# Patient Record
Sex: FEMALE | Race: WHITE | NOT HISPANIC OR LATINO | URBAN - METROPOLITAN AREA
[De-identification: names, ages, dates, MRNs, and addresses within clinical notes are randomized per-mention and may not be internally consistent; named-entity substitution may affect disease eponyms.]

---

## 2024-06-04 ENCOUNTER — OFFICE VISIT (OUTPATIENT)
Dept: URGENT CARE | Facility: CLINIC | Age: 54
End: 2024-06-04
Payer: COMMERCIAL

## 2024-06-04 VITALS
DIASTOLIC BLOOD PRESSURE: 68 MMHG | HEART RATE: 43 BPM | BODY MASS INDEX: 22.41 KG/M2 | WEIGHT: 121.8 LBS | SYSTOLIC BLOOD PRESSURE: 100 MMHG | TEMPERATURE: 97.8 F | RESPIRATION RATE: 18 BRPM | HEIGHT: 62 IN | OXYGEN SATURATION: 100 %

## 2024-06-04 DIAGNOSIS — R42 EPISODIC LIGHTHEADEDNESS: ICD-10-CM

## 2024-06-04 DIAGNOSIS — R53.83 FATIGUE, UNSPECIFIED TYPE: ICD-10-CM

## 2024-06-04 DIAGNOSIS — R51.9 NONINTRACTABLE EPISODIC HEADACHE, UNSPECIFIED HEADACHE TYPE: ICD-10-CM

## 2024-06-04 DIAGNOSIS — M79.10 MYALGIA: Primary | ICD-10-CM

## 2024-06-04 DIAGNOSIS — R68.83 CHILLS: ICD-10-CM

## 2024-06-04 DIAGNOSIS — R00.1 BRADYCARDIA: ICD-10-CM

## 2024-06-04 LAB
SL AMB  POCT GLUCOSE, UA: ABNORMAL
SL AMB LEUKOCYTE ESTERASE,UA: ABNORMAL
SL AMB POCT BILIRUBIN,UA: ABNORMAL
SL AMB POCT BLOOD,UA: ABNORMAL
SL AMB POCT CLARITY,UA: CLEAR
SL AMB POCT COLOR,UA: YELLOW
SL AMB POCT KETONES,UA: ABNORMAL
SL AMB POCT NITRITE,UA: ABNORMAL
SL AMB POCT PH,UA: 7
SL AMB POCT SPECIFIC GRAVITY,UA: 1
SL AMB POCT URINE PROTEIN: ABNORMAL
SL AMB POCT UROBILINOGEN: 0.2

## 2024-06-04 PROCEDURE — 87147 CULTURE TYPE IMMUNOLOGIC: CPT | Performed by: FAMILY MEDICINE

## 2024-06-04 PROCEDURE — 81002 URINALYSIS NONAUTO W/O SCOPE: CPT | Performed by: FAMILY MEDICINE

## 2024-06-04 PROCEDURE — 87086 URINE CULTURE/COLONY COUNT: CPT | Performed by: FAMILY MEDICINE

## 2024-06-04 PROCEDURE — 99203 OFFICE O/P NEW LOW 30 MIN: CPT | Performed by: FAMILY MEDICINE

## 2024-06-04 RX ORDER — ALPRAZOLAM 0.25 MG/1
0.25 TABLET ORAL
COMMUNITY

## 2024-06-04 NOTE — PATIENT INSTRUCTIONS
Patient requires further evaluation and testing to check for conditions that can cause similar symptoms including but not limited to, a tick borne illness, anemia, thryoid hormone issues, and other vitamin or hormone imbalances. Patient also has a low HR at 43, per review of records when patient was seen in April 2024 at [facility], her HR was 46, so this may be the patient's baseline. However I have advised for the patient to follow up with cardiology for further evaluation and treatment. Patient is to rest, drink plenty of fluids, and take Tylenol or Ibuprofen as needed. Patient has been instructed to follow up w/ her PCP office for re-check by the end of the week. If symptoms persist despite treatment, worsen, or any new symptoms present, patient is to be seen in the ER.

## 2024-06-04 NOTE — PROGRESS NOTES
[facility]        NAME: Annabelle Feliz is a 53 y.o. female  : 1970    MRN: [id number]  DATE: 2024  TIME: 5:24 PM    Assessment and Plan   Myalgia [M79.10]  1. Myalgia  POCT urine dip    Urine culture      2. Fatigue, unspecified type        3. Chills        4. Nonintractable episodic headache, unspecified headache type        5. Episodic lightheadedness        6. Bradycardia          Patient Instructions     Patient Instructions   Patient requires further evaluation and testing to check for conditions that can cause similar symptoms including but not limited to, a tick borne illness, anemia, thryoid hormone issues, and other vitamin or hormone imbalances. Patient also has a low HR at 43, per review of records when patient was seen in 2024 at [facility], her HR was 46, so this may be the patient's baseline. However I have advised for the patient to follow up with cardiology for further evaluation and treatment. Patient is to rest, drink plenty of fluids, and take Tylenol or Ibuprofen as needed. Patient has been instructed to follow up w/ her PCP office for re-check by the end of the week. If symptoms persist despite treatment, worsen, or any new symptoms present, patient is to be seen in the ER.    Follow up with PCP in 3-5 days.  Proceed to  ER if symptoms worsen.    If tests have been performed at [facility], our office will contact you with results if changes need to be made to the care plan discussed with you at the visit.  [facility]    Chief Complaint     Chief Complaint   Patient presents with    Fatigue     Patient feeling tired, some back pain, chills, maybe fever, thinks might be a uti.     History of Present Illness     52 yo female has been ill x 2 days. She is experiencing multiple constitutional symptoms including chills, intermittent headaches, body aches, lower back discomfort, fatigue, episodic lightheadedness, and patient states she  feels just generally unwell. No fever, she is currently afebrile at 97.8. She would like to be tested for a UTI because she states she felt this way once previously when she had a UTI. Her back discomfort is primarily in her lumbar region, there is no flank pain. Patient is not experiencing any UTI symptoms. No vaginal bleeding or discharge. No hematuria. No nausea, vomiting, or diarrhea. No abdominal or pelvic pain. No pain radiating into the legs. No numbness/tingling or weakness of the legs. No saddle anesthesia. No loss of bowel/bladder control. No chest pain, SOB, or palpitations. Patient's HR is 43, per review of records, patient's HR in April 2024 was 46. Patient states her pulse runs low as baseline. She states she has seen cardiology previously for a history of MVP, however has not been to their office for follow up in a few years. She is not experiencing any URI symptoms. No sore throat. No cough or wheezing. Patient is not a smoker. No neck pain or stiffness. No joint pains. No recent travel, however patient notes she is going to [place] next week. No known tick bites. She has not seen any bull's eye rashes. No known sick contacts. No dizziness or vision changes. No focal or generalized weakness. No slurred speech. No numbness/tingling. No syncopal episodes. She has not attempted any treatment for the symptoms.      Review of Systems   Review of Systems   Constitutional:  Positive for chills and fatigue.   HENT: Negative.     Eyes: Negative.    Respiratory: Negative.  Negative for shortness of breath.    Cardiovascular: Negative.  Negative for chest pain and palpitations.   Gastrointestinal: Negative.    Endocrine: Negative.    Genitourinary: Negative.    Musculoskeletal:  Positive for back pain and myalgias.   Skin: Negative.  Negative for rash.   Allergic/Immunologic:        As noted in chart   Neurological:  Positive for light-headedness and headaches. Negative for dizziness, tremors, seizures, syncope,  "facial asymmetry, speech difficulty, weakness and numbness.   Hematological: Negative.      Current Medications       Current Outpatient Medications:     ALPRAZolam (XANAX) 0.25 mg tablet, Take 0.25 mg by mouth Take 30 minues before flight, Disp: , Rfl:     Current Allergies     Allergies as of 2024 - Reviewed 2024   Allergen Reaction Noted    Cephalexin Hives 2022    Penicillins Other (See Comments) 2021            The following portions of the patient's history were reviewed and updated as appropriate: allergies, current medications, past family history, past medical history, past social history, past surgical history and problem list.     Past Medical History:   Diagnosis Date    Patient denies medical problems        Past Surgical History:   Procedure Laterality Date     SECTION      x2       Family History   Problem Relation Age of Onset    Aneurysm Mother     Multiple sclerosis Father          Medications have been verified.        Objective   /68   Pulse (!) 43   Temp 97.8 °F (36.6 °C)   Resp 18   Ht 5' 2\" (1.575 m)   Wt 55.2 kg (121 lb 12.8 oz)   SpO2 100%   BMI 22.28 kg/m²   No LMP recorded.       Physical Exam     Physical Exam  Vitals and nursing note reviewed.   Constitutional:       General: She is awake. She is not in acute distress.     Appearance: Normal appearance. She is well-developed, well-groomed and normal weight. She is not ill-appearing, toxic-appearing or diaphoretic.   HENT:      Head: Normocephalic and atraumatic.      Jaw: There is normal jaw occlusion.      Right Ear: Tympanic membrane, ear canal and external ear normal.      Left Ear: Tympanic membrane, ear canal and external ear normal.      Nose: Nose normal.      Mouth/Throat:      Lips: Pink. No lesions.      Mouth: Mucous membranes are moist.      Pharynx: Oropharynx is clear. Uvula midline.   Eyes:      General: Lids are normal. Vision grossly intact. Gaze aligned appropriately.      " Extraocular Movements: Extraocular movements intact.      Conjunctiva/sclera: Conjunctivae normal.      Pupils: Pupils are equal, round, and reactive to light.   Neck:      Trachea: Trachea and phonation normal.   Cardiovascular:      Rate and Rhythm: Regular rhythm. Bradycardia present.      Pulses: Normal pulses.      Heart sounds: Normal heart sounds.   Pulmonary:      Effort: Pulmonary effort is normal. No tachypnea, accessory muscle usage or respiratory distress.      Breath sounds: Normal breath sounds and air entry.   Abdominal:      General: Abdomen is flat. Bowel sounds are normal. There is no distension.      Palpations: Abdomen is soft. There is no mass.      Tenderness: There is no abdominal tenderness. There is no right CVA tenderness, left CVA tenderness, guarding or rebound.      Hernia: No hernia is present.   Musculoskeletal:         General: No swelling, tenderness, deformity or signs of injury. Normal range of motion.      Cervical back: Normal range of motion and neck supple. No edema, erythema, rigidity or tenderness. No pain with movement. Normal range of motion.      Right lower leg: No edema.      Left lower leg: No edema.   Lymphadenopathy:      Cervical: No cervical adenopathy.   Skin:     General: Skin is warm and dry.      Capillary Refill: Capillary refill takes less than 2 seconds.      Coloration: Skin is not pale.      Findings: No rash.      Comments: No Bull's eye rash   Neurological:      General: No focal deficit present.      Mental Status: She is alert and oriented to person, place, and time. Mental status is at baseline.      Cranial Nerves: Cranial nerves 2-12 are intact.      Sensory: Sensation is intact.      Motor: Motor function is intact.      Gait: Gait is intact.   Psychiatric:         Attention and Perception: Attention and perception normal.         Mood and Affect: Mood and affect normal.         Speech: Speech normal.         Behavior: Behavior normal. Behavior is  cooperative.         Thought Content: Thought content normal.         Cognition and Memory: Cognition and memory normal.         Judgment: Judgment normal.

## 2024-06-05 ENCOUNTER — TELEPHONE (OUTPATIENT)
Dept: URGENT CARE | Facility: CLINIC | Age: 54
End: 2024-06-05

## 2024-06-05 DIAGNOSIS — N30.00 ACUTE CYSTITIS WITHOUT HEMATURIA: Primary | ICD-10-CM

## 2024-06-05 LAB — BACTERIA UR CULT: ABNORMAL

## 2024-06-05 RX ORDER — SULFAMETHOXAZOLE AND TRIMETHOPRIM 800; 160 MG/1; MG/1
1 TABLET ORAL EVERY 12 HOURS SCHEDULED
Qty: 10 TABLET | Refills: 0 | Status: SHIPPED | OUTPATIENT
Start: 2024-06-05 | End: 2024-06-10

## 2024-06-05 NOTE — TELEPHONE ENCOUNTER
I have called the patient to discuss her urine cx results, no answer, message left informing patient that the culture shows mild growth of bacteria which would generally not be considered clinically significant, however because the patient did have some symptoms concerning for a possible infection, I am prescribing Bactrim DS x 5 days to trial as treatment for a possible acute cystitis. Rx sent to [facility] in [place]. I have informed the patient to continue w/ management as discussed at the time of the visit, and she is to still see her PCP office for recheck and further evaluation of her symptoms in 3-5 days. If her symptoms persist despite treatment, worsen, or any new symptoms present, patient is to be seen in the ER. Instructed to call back at office number for any questions.